# Patient Record
Sex: FEMALE | Race: WHITE | Employment: FULL TIME | URBAN - METROPOLITAN AREA
[De-identification: names, ages, dates, MRNs, and addresses within clinical notes are randomized per-mention and may not be internally consistent; named-entity substitution may affect disease eponyms.]

---

## 2017-01-06 ENCOUNTER — OFFICE VISIT (OUTPATIENT)
Dept: FAMILY MEDICINE CLINIC | Age: 52
End: 2017-01-06

## 2017-01-06 VITALS
DIASTOLIC BLOOD PRESSURE: 70 MMHG | TEMPERATURE: 99.1 F | BODY MASS INDEX: 26.63 KG/M2 | WEIGHT: 170 LBS | SYSTOLIC BLOOD PRESSURE: 110 MMHG

## 2017-01-06 DIAGNOSIS — J06.9 UPPER RESPIRATORY TRACT INFECTION, UNSPECIFIED TYPE: Primary | ICD-10-CM

## 2017-01-06 PROCEDURE — 99213 OFFICE O/P EST LOW 20 MIN: CPT | Performed by: PHYSICIAN ASSISTANT

## 2017-01-06 RX ORDER — AMOXICILLIN AND CLAVULANATE POTASSIUM 875; 125 MG/1; MG/1
1 TABLET, FILM COATED ORAL 2 TIMES DAILY
Qty: 20 TABLET | Refills: 0 | Status: SHIPPED | OUTPATIENT
Start: 2017-01-06 | End: 2017-01-16

## 2017-01-06 ASSESSMENT — ENCOUNTER SYMPTOMS
SORE THROAT: 1
WHEEZING: 0
SHORTNESS OF BREATH: 0
SINUS PRESSURE: 1
VOMITING: 0
RHINORRHEA: 0
TROUBLE SWALLOWING: 0
COUGH: 1
NAUSEA: 0
DIARRHEA: 0

## 2017-05-01 LAB
HPV COMMENT: NORMAL
HPV TYPE 16: NOT DETECTED
HPV TYPE 18: NOT DETECTED
HPVOH (OTHER TYPES): NOT DETECTED

## 2017-09-13 ENCOUNTER — HOSPITAL ENCOUNTER (OUTPATIENT)
Dept: MAMMOGRAPHY | Age: 52
Discharge: HOME | End: 2017-09-13
Attending: FAMILY MEDICINE | Admitting: FAMILY MEDICINE

## 2017-09-13 DIAGNOSIS — Z12.31 ENCOUNTER FOR SCREENING MAMMOGRAM FOR BREAST CANCER: ICD-10-CM

## 2018-07-18 ENCOUNTER — OFFICE VISIT (OUTPATIENT)
Dept: FAMILY MEDICINE CLINIC | Age: 53
End: 2018-07-18

## 2018-07-18 VITALS
SYSTOLIC BLOOD PRESSURE: 114 MMHG | OXYGEN SATURATION: 98 % | HEIGHT: 67 IN | BODY MASS INDEX: 25.05 KG/M2 | HEART RATE: 76 BPM | WEIGHT: 159.6 LBS | DIASTOLIC BLOOD PRESSURE: 72 MMHG

## 2018-07-18 DIAGNOSIS — H65.01 RIGHT ACUTE SEROUS OTITIS MEDIA, RECURRENCE NOT SPECIFIED: ICD-10-CM

## 2018-07-18 DIAGNOSIS — R42 VERTIGO: ICD-10-CM

## 2018-07-18 DIAGNOSIS — N30.01 ACUTE CYSTITIS WITH HEMATURIA: Primary | ICD-10-CM

## 2018-07-18 LAB
BACTERIA URINE, POC: ABNORMAL
BILIRUBIN URINE: 0 MG/DL
BLOOD, URINE: POSITIVE
CASTS URINE, POC: NEGATIVE
CLARITY: ABNORMAL
COLOR: YELLOW
CRYSTALS URINE, POC: NEGATIVE
EPI CELLS URINE, POC: NEGATIVE
GLUCOSE URINE: NEGATIVE
KETONES, URINE: NEGATIVE
LEUKOCYTE EST, POC: ABNORMAL
NITRITE, URINE: NEGATIVE
PH UA: 6.5 (ref 4.5–8)
PROTEIN UA: NEGATIVE
RBC URINE, POC: NEGATIVE
SPECIFIC GRAVITY UA: 1.01 (ref 1–1.03)
UROBILINOGEN, URINE: NORMAL
WBC URINE, POC: ABNORMAL
YEAST URINE, POC: NEGATIVE

## 2018-07-18 PROCEDURE — 81000 URINALYSIS NONAUTO W/SCOPE: CPT | Performed by: FAMILY MEDICINE

## 2018-07-18 PROCEDURE — 3017F COLORECTAL CA SCREEN DOC REV: CPT | Performed by: FAMILY MEDICINE

## 2018-07-18 PROCEDURE — G8427 DOCREV CUR MEDS BY ELIG CLIN: HCPCS | Performed by: FAMILY MEDICINE

## 2018-07-18 PROCEDURE — G8419 CALC BMI OUT NRM PARAM NOF/U: HCPCS | Performed by: FAMILY MEDICINE

## 2018-07-18 PROCEDURE — 1036F TOBACCO NON-USER: CPT | Performed by: FAMILY MEDICINE

## 2018-07-18 PROCEDURE — 99213 OFFICE O/P EST LOW 20 MIN: CPT | Performed by: FAMILY MEDICINE

## 2018-07-18 RX ORDER — CIPROFLOXACIN 250 MG/1
250 TABLET, FILM COATED ORAL 2 TIMES DAILY
Qty: 14 TABLET | Refills: 0 | Status: SHIPPED | OUTPATIENT
Start: 2018-07-18 | End: 2018-07-25

## 2018-07-18 RX ORDER — GUAIFENESIN, PSEUDOEPHEDRINE HYDROCHLORIDE 600; 60 MG/1; MG/1
1 TABLET, EXTENDED RELEASE ORAL EVERY 12 HOURS
Qty: 20 TABLET | Refills: 1 | Status: SHIPPED | OUTPATIENT
Start: 2018-07-18 | End: 2018-07-28

## 2018-07-18 ASSESSMENT — ENCOUNTER SYMPTOMS
NAUSEA: 1
SORE THROAT: 0
COUGH: 0
VOMITING: 0

## 2018-07-18 NOTE — PROGRESS NOTES
SUBJECTIVE:    Renato Samuels is a 46 y.o. female who presents with complaints of recurrent dizziness and recent onset of frequency of urination. .    Chief Complaint   Patient presents with    Dizziness     patient here today for vertigo, has been going on x 2 weeks, went to urgent care was put on antiibiotic (amoxicillin) and meclizine, has been done with those since the weekend         Dizziness   This is a new problem. The current episode started 1 to 4 weeks ago. The problem occurs intermittently. The problem has been gradually improving. Associated symptoms include nausea, urinary symptoms and vertigo. Pertinent negatives include no chills, congestion, coughing, fever, rash, sore throat or vomiting. The symptoms are aggravated by bending. She has tried immobilization and lying down for the symptoms. The treatment provided mild relief. Dysuria    This is a new problem. The current episode started in the past 7 days. The problem occurs every urination. The problem has been gradually worsening. Quality: none. The pain is at a severity of 0/10. The patient is experiencing no pain. There has been no fever. There is no history of pyelonephritis. Associated symptoms include nausea. Pertinent negatives include no chills or vomiting. She has tried increased fluids and home medications for the symptoms. The treatment provided mild relief. Her past medical history is significant for recurrent UTIs. No past medical history on file. Past Surgical History:   Procedure Laterality Date    BREAST LUMPECTOMY      COLONOSCOPY  2010    repeat in 5 years     Family History   Problem Relation Age of Onset    High Blood Pressure Father     Diabetes Father     Cancer Father         colon    Heart Disease Father      Social History     Social History    Marital status:      Spouse name: N/A    Number of children: N/A    Years of education: N/A     Occupational History    Not on file.      Social History

## 2018-07-19 LAB — URINE CULTURE, ROUTINE: NORMAL

## 2019-01-23 ENCOUNTER — HOSPITAL ENCOUNTER (OUTPATIENT)
Dept: ULTRASOUND IMAGING | Age: 54
Discharge: HOME | End: 2019-01-23
Payer: COMMERCIAL

## 2019-01-23 ENCOUNTER — HOSPITAL ENCOUNTER (OUTPATIENT)
Dept: WOMENS IMAGING | Age: 54
Discharge: HOME | End: 2019-01-23
Payer: COMMERCIAL

## 2019-01-23 DIAGNOSIS — R52 PAIN: ICD-10-CM

## 2019-01-23 DIAGNOSIS — N64.4 BREAST TENDERNESS: ICD-10-CM

## 2019-01-23 PROCEDURE — G0279 TOMOSYNTHESIS, MAMMO: HCPCS

## 2019-01-23 PROCEDURE — 76642 ULTRASOUND BREAST LIMITED: CPT

## 2020-09-23 ENCOUNTER — OFFICE VISIT (OUTPATIENT)
Dept: INTERNAL MEDICINE CLINIC | Age: 55
End: 2020-09-23
Payer: COMMERCIAL

## 2020-09-23 VITALS
DIASTOLIC BLOOD PRESSURE: 72 MMHG | TEMPERATURE: 97.9 F | WEIGHT: 152 LBS | HEIGHT: 67 IN | HEART RATE: 68 BPM | BODY MASS INDEX: 23.86 KG/M2 | SYSTOLIC BLOOD PRESSURE: 108 MMHG

## 2020-09-23 PROBLEM — R25.2 MUSCLE CRAMPS: Status: ACTIVE | Noted: 2020-09-23

## 2020-09-23 PROCEDURE — 99204 OFFICE O/P NEW MOD 45 MIN: CPT | Performed by: NURSE PRACTITIONER

## 2020-09-23 SDOH — ECONOMIC STABILITY: FOOD INSECURITY: WITHIN THE PAST 12 MONTHS, YOU WORRIED THAT YOUR FOOD WOULD RUN OUT BEFORE YOU GOT MONEY TO BUY MORE.: NEVER TRUE

## 2020-09-23 SDOH — ECONOMIC STABILITY: FOOD INSECURITY: WITHIN THE PAST 12 MONTHS, THE FOOD YOU BOUGHT JUST DIDN'T LAST AND YOU DIDN'T HAVE MONEY TO GET MORE.: NEVER TRUE

## 2020-09-23 SDOH — ECONOMIC STABILITY: INCOME INSECURITY: HOW HARD IS IT FOR YOU TO PAY FOR THE VERY BASICS LIKE FOOD, HOUSING, MEDICAL CARE, AND HEATING?: NOT HARD AT ALL

## 2020-09-23 SDOH — ECONOMIC STABILITY: TRANSPORTATION INSECURITY
IN THE PAST 12 MONTHS, HAS THE LACK OF TRANSPORTATION KEPT YOU FROM MEDICAL APPOINTMENTS OR FROM GETTING MEDICATIONS?: NO

## 2020-09-23 SDOH — ECONOMIC STABILITY: TRANSPORTATION INSECURITY
IN THE PAST 12 MONTHS, HAS LACK OF TRANSPORTATION KEPT YOU FROM MEETINGS, WORK, OR FROM GETTING THINGS NEEDED FOR DAILY LIVING?: NO

## 2020-09-23 ASSESSMENT — ENCOUNTER SYMPTOMS
VOMITING: 0
NAUSEA: 0
BLOOD IN STOOL: 0
SORE THROAT: 0
ABDOMINAL PAIN: 0
DIARRHEA: 0
WHEEZING: 0
SHORTNESS OF BREATH: 0
BACK PAIN: 0
COUGH: 0
SINUS PAIN: 0
CONSTIPATION: 0

## 2020-09-23 ASSESSMENT — PATIENT HEALTH QUESTIONNAIRE - PHQ9
SUM OF ALL RESPONSES TO PHQ QUESTIONS 1-9: 0
1. LITTLE INTEREST OR PLEASURE IN DOING THINGS: 0
SUM OF ALL RESPONSES TO PHQ QUESTIONS 1-9: 0
2. FEELING DOWN, DEPRESSED OR HOPELESS: 0
SUM OF ALL RESPONSES TO PHQ9 QUESTIONS 1 & 2: 0

## 2020-09-23 NOTE — PATIENT INSTRUCTIONS
Please get your fasting lab work (no food or drink for 10-12 hours prior besides water) completed M-F [id number] at our office. [facility] lab has walk-in hours available as well - they are open Saturday 7a-3p - no appointment is needed. We will call with your results.     Call to schedule colonoscopy and mammogram

## 2020-09-23 NOTE — PROGRESS NOTES
pain, palpitations and leg swelling. Gastrointestinal: Negative for abdominal pain, blood in stool, constipation, diarrhea, nausea and vomiting. Genitourinary: Negative for dysuria, frequency, hematuria and urgency. Musculoskeletal: Positive for myalgias. Negative for back pain, gait problem and neck pain. Skin: Negative for pallor and rash. Hair on neck   Neurological: Negative for dizziness, weakness, light-headedness, numbness and headaches. Psychiatric/Behavioral: Negative for behavioral problems, confusion, dysphoric mood, sleep disturbance and suicidal ideas. The patient is not nervous/anxious. Allergies   Allergen Reactions    Codeine Nausea Only     Family History   Problem Relation Age of Onset    High Blood Pressure Father     Diabetes Father     Heart Disease Father     Colon Cancer Father     No Known Problems Mother     Colon Cancer Maternal Grandmother     Breast Cancer Neg Hx     Ovarian Cancer Neg Hx     Heart Attack Neg Hx     Stroke Neg Hx      Current Outpatient Rx   Medication Sig Dispense Refill    MAGNESIUM PO Take by mouth Indications: octavio-magnesium liquid 150mg.       Misc Natural Products (SUPER GREENS PO) Take by mouth      NONFORMULARY drenamin for stress       Social History     Socioeconomic History    Marital status:      Spouse name: Not on file    Number of children: Not on file    Years of education: Not on file    Highest education level: Not on file   Occupational History    Not on file   Social Needs    Financial resource strain: Not hard at all   [employer] insecurity     Worry: Never true     Inability: Never true   [employer] needs     Medical: No     Non-medical: No   Tobacco Use    Smoking status: Former Smoker     Packs/day: 0.50     Years: 10.00     Pack years: 5.00     Types: Cigarettes     Last attempt to quit: 1993     Years since quittin.7    Smokeless tobacco: Never Used   Substance and Sexual Activity    rate and regular rhythm. Pulmonary:      Effort: Pulmonary effort is normal. No respiratory distress. Breath sounds: Normal breath sounds. No wheezing or rales. Chest:      Chest wall: No tenderness. Abdominal:      General: Bowel sounds are normal. There is no distension. Palpations: Abdomen is soft. Tenderness: There is no abdominal tenderness. There is no guarding. Musculoskeletal: Normal range of motion. General: No tenderness or deformity. Skin:     General: Skin is warm and dry. Coloration: Skin is not pale. Findings: No erythema or rash. Neurological:      Mental Status: She is alert and oriented to person, place, and time. Coordination: Coordination normal.   Psychiatric:         Mood and Affect: Mood normal.       Assessment and Plan:  Thelma Burgess was seen today for new patient. Diagnoses and all orders for this visit:    Encounter to establish care with new doctor   - Lifestyle modifications such as exercise, weight loss and healthy diet encouraged and reviewed with the pt. - Labs   - Recommended influenza vaccine. The patient declines. She states she is not interested in putting unnatural injections into her body. Risks vs benefits reviewed. Breast cancer screening  -     [facility] DIGITAL SCREEN W OR WO CAD BILATERAL; Future    Screening for HIV (human immunodeficiency virus)  -    Asymptomatic. Pt agreeable. - HIV Screen; Future    Need for hepatitis C screening test  -     Asymptomatic. Pt agreeable. - HEPATITIS C ANTIBODY; Future    Muscle cramps  -    Symptoms for years, but worsening. Magnesium supplements were providing relief, but no longer doing so. - Symptoms worse at night  - No redness, swelling, heat or tenderness. Ambulation normal.  - Not interested in medications at this time. Would like evaluate labs at this time.  - COMPREHENSIVE METABOLIC PANEL; Future  -     CBC Auto Differential; Future  -     MAGNESIUM;  Future  -     TSH WITH REFLEX TO FT4; Future  -     VITAMIN B12 & FOLATE; Future  -     IRON AND TIBC; Future  -     FERRITIN; Future    Screen for colon cancer  -     COLONOSCOPY (Screening)    Screening, lipid  -     Lipid, Fasting; Future    Abnormal direction of hair growth  -    Following with OB/GYN  - TSH WITH REFLEX TO FT4; Future    Stressful life event affecting family   - Recommended psychology referral.  The patient declines. She reports she has a good support system at home and she will call if she changes her mind. Return in about 2 weeks (around 10/7/2020) for muscle cramps , or sooner if needed. Pt will call if symptoms worsen or fail to improve. All questions answered. Pt states no further questions or concerns at this time.    Electronically signed by: Allegra Dumont 09/23/20

## 2020-09-28 DIAGNOSIS — Z11.59 NEED FOR HEPATITIS C SCREENING TEST: ICD-10-CM

## 2020-09-28 DIAGNOSIS — Z11.4 SCREENING FOR HIV (HUMAN IMMUNODEFICIENCY VIRUS): ICD-10-CM

## 2020-09-28 DIAGNOSIS — Z13.220 SCREENING, LIPID: ICD-10-CM

## 2020-09-28 DIAGNOSIS — L67.8 ABNORMAL DIRECTION OF HAIR GROWTH: ICD-10-CM

## 2020-09-28 DIAGNOSIS — R25.2 MUSCLE CRAMPS: ICD-10-CM

## 2020-09-28 LAB
A/G RATIO: 2.7 (ref 1.1–2.2)
ALBUMIN SERPL-MCNC: 4.3 G/DL (ref 3.4–5)
ALP BLD-CCNC: 56 U/L (ref 40–129)
ALT SERPL-CCNC: 26 U/L (ref 10–40)
ANION GAP SERPL CALCULATED.3IONS-SCNC: 13 MMOL/L (ref 3–16)
AST SERPL-CCNC: 20 U/L (ref 15–37)
BASOPHILS ABSOLUTE: 0.1 K/UL (ref 0–0.2)
BASOPHILS RELATIVE PERCENT: 1 %
BILIRUB SERPL-MCNC: 0.4 MG/DL (ref 0–1)
BUN BLDV-MCNC: 8 MG/DL (ref 7–20)
CALCIUM SERPL-MCNC: 9.3 MG/DL (ref 8.3–10.6)
CHLORIDE BLD-SCNC: 107 MMOL/L (ref 99–110)
CHOLESTEROL, FASTING: 200 MG/DL (ref 0–199)
CO2: 26 MMOL/L (ref 21–32)
CREAT SERPL-MCNC: 0.8 MG/DL (ref 0.6–1.1)
EOSINOPHILS ABSOLUTE: 0.1 K/UL (ref 0–0.6)
EOSINOPHILS RELATIVE PERCENT: 0.9 %
FERRITIN: 98.7 NG/ML (ref 15–150)
FOLATE: >20 NG/ML (ref 4.78–24.2)
GFR AFRICAN AMERICAN: >60
GFR NON-AFRICAN AMERICAN: >60
GLOBULIN: 1.6 G/DL
GLUCOSE BLD-MCNC: 109 MG/DL (ref 70–99)
HCT VFR BLD CALC: 48.4 % (ref 36–48)
HDLC SERPL-MCNC: 46 MG/DL (ref 40–60)
HEMOGLOBIN: 16.2 G/DL (ref 12–16)
HEPATITIS C ANTIBODY INTERPRETATION: NORMAL
IRON SATURATION: 32 % (ref 15–50)
IRON: 108 UG/DL (ref 37–145)
LDL CHOLESTEROL CALCULATED: 133 MG/DL
LYMPHOCYTES ABSOLUTE: 2.4 K/UL (ref 1–5.1)
LYMPHOCYTES RELATIVE PERCENT: 36.7 %
MAGNESIUM: 2.1 MG/DL (ref 1.8–2.4)
MCH RBC QN AUTO: 30.7 PG (ref 26–34)
MCHC RBC AUTO-ENTMCNC: 33.6 G/DL (ref 31–36)
MCV RBC AUTO: 91.5 FL (ref 80–100)
MONOCYTES ABSOLUTE: 0.4 K/UL (ref 0–1.3)
MONOCYTES RELATIVE PERCENT: 5.3 %
NEUTROPHILS ABSOLUTE: 3.7 K/UL (ref 1.7–7.7)
NEUTROPHILS RELATIVE PERCENT: 56.1 %
PDW BLD-RTO: 13.5 % (ref 12.4–15.4)
PLATELET # BLD: 268 K/UL (ref 135–450)
PMV BLD AUTO: 7.9 FL (ref 5–10.5)
POTASSIUM SERPL-SCNC: 5.2 MMOL/L (ref 3.5–5.1)
RBC # BLD: 5.28 M/UL (ref 4–5.2)
SODIUM BLD-SCNC: 146 MMOL/L (ref 136–145)
TOTAL IRON BINDING CAPACITY: 336 UG/DL (ref 260–445)
TOTAL PROTEIN: 5.9 G/DL (ref 6.4–8.2)
TRIGLYCERIDE, FASTING: 105 MG/DL (ref 0–150)
TSH REFLEX FT4: 1.8 UIU/ML (ref 0.27–4.2)
VITAMIN B-12: 443 PG/ML (ref 211–911)
VLDLC SERPL CALC-MCNC: 21 MG/DL
WBC # BLD: 6.6 K/UL (ref 4–11)

## 2020-09-29 LAB
HIV AG/AB: NORMAL
HIV ANTIGEN: NORMAL
HIV-1 ANTIBODY: NORMAL
HIV-2 AB: NORMAL

## 2020-10-08 LAB
CORTISOL TOTAL: 18.2 UG/DL
ESTRADIOL LEVEL: 30 PG/ML
PROGESTERONE LEVEL: 0.3 NG/ML

## 2020-10-10 LAB
SEX HORMONE BINDING GLOBULIN: 37 NMOL/L (ref 30–135)
TESTOSTERONE FREE-NONMALE: 102.5 PG/ML (ref 0.6–3.8)
TESTOSTERONE TOTAL: 520 NG/DL (ref 20–70)

## 2020-10-12 ENCOUNTER — NURSE TRIAGE (OUTPATIENT)
Dept: OTHER | Facility: CLINIC | Age: 55
End: 2020-10-12

## 2020-10-13 ENCOUNTER — OFFICE VISIT (OUTPATIENT)
Dept: PRIMARY CARE CLINIC | Age: 55
End: 2020-10-13
Payer: COMMERCIAL

## 2020-10-13 PROCEDURE — 99211 OFF/OP EST MAY X REQ PHY/QHP: CPT | Performed by: NURSE PRACTITIONER

## 2020-10-13 NOTE — PROGRESS NOTES
Zeb Collazo received a viral test for COVID-19. They were educated on isolation and quarantine as appropriate. For any symptoms, they were directed to seek care from their PCP, given contact information to establish with a doctor, directed to an urgent care or the emergency room.

## 2020-10-13 NOTE — PATIENT INSTRUCTIONS

## 2020-10-14 NOTE — PROGRESS NOTES
TELEHEALTH EVALUATION -- Audio/Visual (During BSDDP-42 public health emergency)  Office Visit   10/15/2020    Subjective:  Chief Complaint   Patient presents with    Spasms     muscle cramps not gone      HPI:   Delfin Aponte is a 63 y.o. female who presents today for follow up.     Muscle cramps and spasms-  in her lower legs. States that she takes magnesium supplements, which used to control these cramps and now they don't seem to be working as well. Worse if she skips magnesium supplements. Worse at night. Worse in the left leg compared to the right. Worse in the calf and the arch of the foot. Occurring for 5-10 years. No redness/heat/swelling/tenderness. She is stretching occasionally. Drinks 8 - 16 ounces of water per day. Does not drink alcohol. Drinks 16 ounces of caffeine-coffee- in the morning. Pt reports that she is not interested in daily prescription medications. She would like to only discuss natural treatments/supplements after labs. She had labs ordered which showed low protein and high K. She states that she never called back for labs d/t call center inconsistencies. No chest pain, palpitations, shortness of breath, trouble breathing, lightheadedness, dizziness or blurred vision. No other acute concerns. Review of Systems   Constitutional: Negative for chills, fatigue, fever and unexpected weight change. Eyes: Negative for visual disturbance. Respiratory: Negative for cough, shortness of breath and wheezing. Cardiovascular: Negative for chest pain, palpitations and leg swelling. Musculoskeletal:        Nocturnal leg cramps   Neurological: Negative for dizziness, weakness, light-headedness, numbness and headaches. Allergies   Allergen Reactions    Codeine Nausea Only       Current Outpatient Rx   Medication Sig Dispense Refill    MAGNESIUM PO Take by mouth Indications: octavio-magnesium liquid 150mg.       Misc Natural Products (SUPER GREENS PO) Take by mouth      NONFORMULARY drenamin for stress         Patient Active Problem List   Diagnosis    Muscle cramps        Wt Readings from Last 3 Encounters:   09/23/20 152 lb (68.9 kg)   07/18/18 159 lb 9.6 oz (72.4 kg)   01/06/17 170 lb (77.1 kg)     BP Readings from Last 3 Encounters:   09/23/20 108/72   07/18/18 114/72   01/06/17 110/70     The 10-year ASCVD risk score (Jazzmine Carl et al., 2013) is: 1.9%    Values used to calculate the score:      Age: 63 years      Sex: Female      Is Non- : No      Diabetic: No      Tobacco smoker: No      Systolic Blood Pressure: 142 mmHg      Is BP treated: No      HDL Cholesterol: 46 mg/dL      Total Cholesterol: 200 mg/dL      Objective/Physical Exam:  Virtual Video Exam  LMP 04/02/2011   Patient-Reported Vitals 10/15/2020   Patient-Reported Weight 143lbs     ^no access to other VS d/t VV per pt. Physical Exam  Constitutional:       General: She is not in acute distress. Appearance: Normal appearance. She is not ill-appearing. Pulmonary:      Effort: Pulmonary effort is normal. No respiratory distress. Skin:     Coloration: Skin is not jaundiced or pale. Neurological:      Mental Status: She is alert. Comments: No facial asymmetry noted   Psychiatric:         Mood and Affect: Mood normal.       Due to this being a TeleHealth encounter, evaluation of the following organ systems is limited: Vitals/Constitutional/EENT/Resp/CV/GI//MS/Neuro/Skin/Heme-Lymph-Imm. Assessment and Plan:  Francine Izquierdo was seen today for spasms. Diagnoses and all orders for this visit:    Muscle cramps/Elevated blood sugar/Serum potassium elevated/elevated LDL  -     All lab results reviewed with the pt. - K was elevated- recommend she stop any K supplements. Repeat lab orders were placed. She will stop any K supplements and decrease K foods (states she eats multiple high K foods daily) and have labs repeated. - Protein was low.  Recommended increase protein intake. - Reviewed that there are supplements such as vitamin B6 and vitamin E that can be trialed for nocturnal leg cramps. Patient states she would like to decrease her potassium and increase her protein and reevaluate first.  - Lab orders placed. - Lifestyle modifications such as exercise, weight loss and healthy diet encouraged and reviewed with the pt.  - HEMOGLOBIN A1C; Future  -     COMPREHENSIVE METABOLIC PANEL; Future    Seeing OBGYN and having further testing regarding elevated testosterone. Return in about 4 weeks (around 11/12/2020) for muscle spasms, or sooner if needed. Pt will call if symptoms worsen or fail to improve. Effie John is a 63 y.o. female being evaluated by a Virtual Visit (video visit) encounter to address concerns as mentioned above. A caregiver was present when appropriate. Due to this being a TeleHealth encounter (During ANZEU-84 public health emergency), evaluation of the following organ systems was limited: Vitals/Constitutional/EENT/Resp/CV/GI//MS/Neuro/Skin/Heme-Lymph-Imm. Pursuant to the emergency declaration under the [address] [place] [address], [address] [place] [address] authority and the [employer] Act, this Virtual Visit was conducted with patient's (and/or legal guardian's) consent, to reduce the patient's risk of exposure to COVID-19 and provide necessary medical care. The patient (and/or legal guardian) has also been advised to contact this office for worsening conditions or problems, and seek emergency medical treatment and/or call 911 if deemed necessary. Patient identification was verified at the start of the visit: Yes    Total time spent on this encounter: Not billed by time    Services were provided through a video synchronous discussion virtually to substitute for in-person clinic visit. Patient and provider were located at their individual homes. All questions answered.  Pt states no further questions or concerns at this time.    Electronically signed by: Brenda Antonio 10/15/20

## 2020-10-15 ENCOUNTER — VIRTUAL VISIT (OUTPATIENT)
Dept: INTERNAL MEDICINE CLINIC | Age: 55
End: 2020-10-15
Payer: COMMERCIAL

## 2020-10-15 LAB
SARS-COV-2: NOT DETECTED
SOURCE: NORMAL

## 2020-10-15 PROCEDURE — 99213 OFFICE O/P EST LOW 20 MIN: CPT | Performed by: NURSE PRACTITIONER

## 2020-10-15 ASSESSMENT — ENCOUNTER SYMPTOMS
COUGH: 0
WHEEZING: 0
SHORTNESS OF BREATH: 0

## 2020-10-15 NOTE — RESULT ENCOUNTER NOTE

## 2020-10-29 LAB — CA 125: 10.8 U/ML (ref 0–35)

## 2020-10-31 LAB — AFP: 27.6 UG/L

## 2020-11-02 LAB — DHEAS (DHEA SULFATE): 272 UG/DL (ref 26–200)

## 2020-11-03 LAB — 17-OH PROGESTERONE LCMS: 263.77 NG/DL

## 2021-06-11 ENCOUNTER — TELEPHONE (OUTPATIENT)
Dept: INTERNAL MEDICINE CLINIC | Age: 56
End: 2021-06-11

## 2021-06-11 NOTE — TELEPHONE ENCOUNTER
----- Message from Robert Dominguez, [address] - CNP sent at 6/8/2021  1:01 PM EDT -----  Please call and inform the patient that she should schedule for her mammogram and colonoscopy.   Please documentPlease schedule the patient for September physical.

## 2021-06-11 NOTE — TELEPHONE ENCOUNTER
Called to advise pt to have her mammo and colonoscopy scheduled and to schedule her for her annual exam in sept. Pt stated she was with pts right now and can not talk. Asked me to call her back and leave a vm.

## 2021-06-25 ENCOUNTER — TELEPHONE (OUTPATIENT)
Dept: INTERNAL MEDICINE CLINIC | Age: 56
End: 2021-06-25

## 2021-06-25 NOTE — TELEPHONE ENCOUNTER
----- Message from Dimas Cordova, [address] - CNP sent at 6/24/2021  1:36 PM EDT -----  Patient overdue for mammogram.  Order was previously placed. Please inform patient to call and schedule this. Please document.   If no answer, please send a letter  Please schedule physical in September for follow-up

## 2021-06-25 NOTE — TELEPHONE ENCOUNTER
LVM informing pt she is overdue for mammogram and due for a follow up/physical in September. Letter sent.

## 2021-07-21 ENCOUNTER — TELEPHONE (OUTPATIENT)
Dept: INTERNAL MEDICINE CLINIC | Age: 56
End: 2021-07-21

## 2021-07-21 NOTE — TELEPHONE ENCOUNTER
----- Message from Maggi Barbosa sent at 7/21/2021  2:25 PM EDT -----  Subject: Appointment Request    Reason for Call: Urgent Skin Problem    QUESTIONS  Type of Appointment? Established Patient  Reason for appointment request? Available appointments did not meet   patient need  Additional Information for Provider? Patient is requesting appointment for   07/228/2021 for lump on right arm underneath skin. Patient would like a   call back  ---------------------------------------------------------------------------  --------------  [employer]  What is the best way for the office to contact you? OK to leave message on   voicemail  Preferred Call Back Phone Number? [phone]  ---------------------------------------------------------------------------  --------------  SCRIPT ANSWERS  Relationship to Patient? Self  Appointment reason? Symptomatic  Select script based on patient symptoms? Adult Skin Problems [Rash, Hives,   Blisters, Lumps, Bumps, Sores, Bite]  Are you having swelling in your throat or face? No  Are you having difficulty breathing? No  Have the symptoms worsened or spread in the last day? No  Are you having fevers (100.4), chills or sweats? No  Have you previously seen a provider for this? No  Have you been diagnosed with, awaiting test results for, or told that you   are suspected of having COVID-19 (Coronavirus)? (If patient has tested   negative or was tested as a requirement for work, school, or travel and   not based on symptoms, answer no)? No  Do you currently have flu-like symptoms including fever or chills, cough,   shortness of breath, difficulty breathing, or new loss of taste or smell? No  Have you had close contact with someone with COVID-19 in the last 14 days? No  (Service Expert  click yes below to proceed with [employer] As Usual   Scheduling)?  Yes

## 2021-07-28 ENCOUNTER — OFFICE VISIT (OUTPATIENT)
Dept: INTERNAL MEDICINE CLINIC | Age: 56
End: 2021-07-28
Payer: COMMERCIAL

## 2021-07-28 VITALS
TEMPERATURE: 98.2 F | BODY MASS INDEX: 21.83 KG/M2 | DIASTOLIC BLOOD PRESSURE: 62 MMHG | OXYGEN SATURATION: 98 % | SYSTOLIC BLOOD PRESSURE: 120 MMHG | HEART RATE: 86 BPM | WEIGHT: 139.4 LBS

## 2021-07-28 DIAGNOSIS — M79.601 RIGHT ARM PAIN: ICD-10-CM

## 2021-07-28 DIAGNOSIS — Z63.79 OTHER STRESSFUL LIFE EVENTS AFFECTING FAMILY AND HOUSEHOLD: Primary | ICD-10-CM

## 2021-07-28 DIAGNOSIS — Z12.11 SCREEN FOR COLON CANCER: ICD-10-CM

## 2021-07-28 DIAGNOSIS — Z12.31 ENCOUNTER FOR SCREENING MAMMOGRAM FOR MALIGNANT NEOPLASM OF BREAST: ICD-10-CM

## 2021-07-28 DIAGNOSIS — Z13.31 POSITIVE DEPRESSION SCREENING: ICD-10-CM

## 2021-07-28 PROCEDURE — 99214 OFFICE O/P EST MOD 30 MIN: CPT | Performed by: NURSE PRACTITIONER

## 2021-07-28 PROCEDURE — G8431 POS CLIN DEPRES SCRN F/U DOC: HCPCS | Performed by: NURSE PRACTITIONER

## 2021-07-28 RX ORDER — HYDROXYZINE 50 MG/1
50 TABLET, FILM COATED ORAL EVERY 8 HOURS PRN
Qty: 30 TABLET | Refills: 0 | Status: SHIPPED
Start: 2021-07-28 | End: 2021-08-17 | Stop reason: SINTOL

## 2021-07-28 ASSESSMENT — PATIENT HEALTH QUESTIONNAIRE - PHQ9
7. TROUBLE CONCENTRATING ON THINGS, SUCH AS READING THE NEWSPAPER OR WATCHING TELEVISION: 2
2. FEELING DOWN, DEPRESSED OR HOPELESS: 3
SUM OF ALL RESPONSES TO PHQ QUESTIONS 1-9: 11
6. FEELING BAD ABOUT YOURSELF - OR THAT YOU ARE A FAILURE OR HAVE LET YOURSELF OR YOUR FAMILY DOWN: 0
1. LITTLE INTEREST OR PLEASURE IN DOING THINGS: 0
4. FEELING TIRED OR HAVING LITTLE ENERGY: 3
3. TROUBLE FALLING OR STAYING ASLEEP: 3
9. THOUGHTS THAT YOU WOULD BE BETTER OFF DEAD, OR OF HURTING YOURSELF: 0
SUM OF ALL RESPONSES TO PHQ9 QUESTIONS 1 & 2: 3
10. IF YOU CHECKED OFF ANY PROBLEMS, HOW DIFFICULT HAVE THESE PROBLEMS MADE IT FOR YOU TO DO YOUR WORK, TAKE CARE OF THINGS AT HOME, OR GET ALONG WITH OTHER PEOPLE: 2
8. MOVING OR SPEAKING SO SLOWLY THAT OTHER PEOPLE COULD HAVE NOTICED. OR THE OPPOSITE, BEING SO FIGETY OR RESTLESS THAT YOU HAVE BEEN MOVING AROUND A LOT MORE THAN USUAL: 0
SUM OF ALL RESPONSES TO PHQ QUESTIONS 1-9: 11
5. POOR APPETITE OR OVEREATING: 0
SUM OF ALL RESPONSES TO PHQ QUESTIONS 1-9: 11

## 2021-07-28 ASSESSMENT — COLUMBIA-SUICIDE SEVERITY RATING SCALE - C-SSRS
2. HAVE YOU ACTUALLY HAD ANY THOUGHTS OF KILLING YOURSELF?: NO
1. WITHIN THE PAST MONTH, HAVE YOU WISHED YOU WERE DEAD OR WISHED YOU COULD GO TO SLEEP AND NOT WAKE UP?: NO
6. HAVE YOU EVER DONE ANYTHING, STARTED TO DO ANYTHING, OR PREPARED TO DO ANYTHING TO END YOUR LIFE?: NO

## 2021-07-28 ASSESSMENT — ENCOUNTER SYMPTOMS
COUGH: 0
WHEEZING: 0
SHORTNESS OF BREATH: 0

## 2021-07-28 NOTE — PROGRESS NOTES
Acute Office Visit  7/28/2021    SUBJECTIVE:    Patient ID: Claudia Somers is a 61 y.o. female. Chief Complaint   Patient presents with    Mass     pt states a few weeks ago she noticed a lump on her right arm, sore, no meds taken for pain     HPI: The patient presents to the office for an acute visit. Pt reports that a few weeks ago, her right outside of elbow was hurting. She states she noticed a bump on that arm. She states if she plays with this it hurts. If not, it is fine. Not growing or shrinking. Denies open wound. Denies injury/truama. Denies fevers/chills. ROM normal. Has not done anything for this. Denies redness/swelling/heat. States that it \"may be nothing\" but she has another concern as well. Pt reports that she is in the \"middle of a divorce that is getting pretty nasty. \" States it is getting expensive. States she is not sleeping well- states this is abnormal. She is also getting her house ready to sell and looking for a new home. Has \"debt issues\" as well. Denies depression. States she has increased anxiety and has had panic attacks. She states that she uses herbal medications that helps. Pt reports \"I think I need something more than herbs and spices. \"  Has a good support system. Allergies   Allergen Reactions    Codeine Nausea Only     Current Outpatient Medications   Medication Sig Dispense Refill    hydrOXYzine (ATARAX) 50 MG tablet Take 1 tablet by mouth every 8 hours as needed for Anxiety 30 tablet 0    MAGNESIUM PO Take by mouth Indications: octavio-magnesium liquid 150mg.  Misc Natural Products (SUPER GREENS PO) Take by mouth      NONFORMULARY drenamin for stress       No current facility-administered medications for this visit. Review of Systems   Constitutional: Negative for chills, fatigue, fever and unexpected weight change. Eyes: Negative for visual disturbance. Respiratory: Negative for cough, shortness of breath and wheezing.     Cardiovascular: Negative for chest pain, palpitations and leg swelling. Musculoskeletal:        Right arm pain   Skin: Negative for pallor and rash. Neurological: Negative for dizziness, weakness, light-headedness, numbness and headaches. Psychiatric/Behavioral: Positive for dysphoric mood and sleep disturbance. Negative for self-injury and suicidal ideas. The patient is nervous/anxious. Stress     PHQ-9 Total Score: 11 (7/28/2021  1:36 PM)  Thoughts that you would be better off dead, or of hurting yourself in some way: 0 (7/28/2021  1:36 PM)    OBJECTIVE:  /62   Pulse 86   Temp 98.2 °F (36.8 °C) (Temporal)   Wt 139 lb 6.4 oz (63.2 kg)   LMP 04/02/2011   SpO2 98%   BMI 21.83 kg/m²    Physical Exam  Vitals reviewed. Constitutional:       General: She is not in acute distress. Appearance: She is well-developed. She is not diaphoretic. HENT:      Head: Normocephalic and atraumatic. Cardiovascular:      Rate and Rhythm: Normal rate and regular rhythm. Pulmonary:      Effort: Pulmonary effort is normal. No respiratory distress. Breath sounds: Normal breath sounds. No wheezing or rales. Chest:      Chest wall: No tenderness. Skin:     General: Skin is warm and dry. Neurological:      Mental Status: She is alert and oriented to person, place, and time. Coordination: Coordination normal.        ASSESSMENT/PLAN:  Julia Mabry was seen today for mass. Diagnoses and all orders for this visit:    Other stressful life events affecting family and household/Positive depression screening  -    PHQ 9 is 6. Denies suicidal or homicidal ideation.  - Since anxiety and dysphoric mood are frequent, I recommended a daily medication. The patient declines. - Recommended psychology referral- pt declines. - Patient reports that she would like an as needed medication for anxiety/anxiety attacks that she can take before bed as her mind races. - Options were reviewed.   Patient is agreeable to hydroxyzine. - Positive Screen for Clinical Depression with a Documented Follow-up Plan   -     hydrOXYzine (ATARAX) 50 MG tablet; Take 1 tablet by mouth every 8 hours as needed for Anxiety - patient education handout provided and reviewed with the pt. - Patient will call if symptoms worsen or fail to improve    Right arm pain   - Unable to palpate or locate the bump the patient is speaking of. The patient states she has a lot going on and she may not have any bump present. Reviewed imaging to evaluate further. The patient declines. She states she will use symptomatic management and monitor. She will call if symptoms worsen or fail to improve    Screening for breast cancer/screening for colon cancer  Mammogram and colon cancer screening not completed as ordered. Strongly recommend the patient complete mammogram and colonoscopy. Patient would like to complete the FIT test instead. 30 minutes were spent reviewing the chart, coordinating care of the patient and counseling face to face with the patient. Return for 2-4 weeks physical , or sooner if needed. Pt informed to call if symptoms worsen or fail to improve. All questions answered. Patient states no further questions or concerns at this time.     Electronically signed by NIEVES Carmichael CNP 07/28/21

## 2021-07-28 NOTE — PATIENT INSTRUCTIONS
Call to schedule mammogram  Bring stool sample back to the office      Patient Education   hydroxyzine  Pronunciation:  melissa reyes  Brand:  Vistaril  What is the most important information I should know about hydroxyzine? You should not use hydroxyzine if you are pregnant, especially during the first or second trimester. Hydroxyzine can cause a serious heart problem, especially if you use certain medicines at the same time. Tell your doctor about all your current medicines and any you start or stop using. What is hydroxyzine? Hydroxyzine reduces activity in the central nervous system. It also acts as an antihistamine that reduces the effects of natural chemical histamine in the body. Histamine can produce symptoms of itching, or hives on the skin. Hydroxyzine is used as a sedative to treat anxiety and tension. It is also used together with other medications given during and after general anesthesia. Hydroxyzine is also used to treat allergic skin reactions such as hives or contact dermatitis. Hydroxyzine may also be used for purposes not listed in this medication guide. What should I discuss with my healthcare provider before taking hydroxyzine? You should not use hydroxyzine if you are allergic to it, or if:  · you have long QT syndrome;  · you are allergic to cetirizine (Zyrtec) or levocetirizine (Xyzal); or  · you are in the first trimester of pregnancy. You should not use hydroxyzine if you are pregnant, especially during the first or second trimester. Hydroxyzine could harm the unborn baby or cause birth defects. Use effective birth control to prevent pregnancy while you are using this medicine.   To make sure hydroxyzine is safe for you, tell your doctor if you have:  · blockage in your digestive tract (stomach or intestines);  · bladder obstruction or other urination problems;  · glaucoma;  · heart disease, slow heartbeats;  · personal or family history of long QT syndrome;  · an electrolyte imbalance (such as high or low levels of potassium in your blood);  · if you have recently had a heart attack. It is not known whether hydroxyzine passes into breast milk or if it could harm a nursing baby. You should not breast-feed while using this medicine. Do not give this medicine to a child without medical advice. How should I take hydroxyzine? Follow all directions on your prescription label. Your doctor may occasionally change your dose. Do not use this medicine in larger or smaller amounts or for longer than recommended. Shake the oral suspension (liquid) well just before you measure a dose. Measure liquid medicine with the dosing syringe provided, or with a special dose-measuring spoon or medicine cup. If you do not have a dose-measuring device, ask your pharmacist for one. Hydroxyzine is for short-term use only. You should not take this medicine for longer than 4 months. Call your doctor if your anxiety symptoms do not improve, or if they get worse. Store at room temperature away from moisture and heat. What happens if I miss a dose? Take the missed dose as soon as you remember. Skip the missed dose if it is almost time for your next scheduled dose. Do not take extra medicine to make up the missed dose. What happens if I overdose? Seek emergency medical attention or call the Poison Help line at 1-614.924.5055. Overdose symptoms may include severe drowsiness, nausea, vomiting, uncontrolled muscle movements, or seizure (convulsions). What should I avoid while taking hydroxyzine? This medicine may impair your thinking or reactions. Be careful if you drive or do anything that requires you to be alert. Drinking alcohol with this medicine can cause side effects. What are the possible side effects of hydroxyzine? Get emergency medical help if you have signs of an allergic reaction:  hives; difficult breathing; swelling of your face, lips, tongue, or throat.   In rare cases, hydroxyzine may cause a severe skin reaction. Stop taking this medicine and call your doctor right away if you have sudden skin redness or a rash that spreads and causes white or yellow pustules, blistering, or peeling. Stop using hydroxyzine and call your doctor at once if you have:  · fast or pounding heartbeats;  · headache with chest pain;  · severe dizziness, fainting; or  · a seizure (convulsions). Side effects such as drowsiness and confusion may be more likely in older adults. Common side effects may include:  · drowsiness;  · headache;  · dry mouth; or  · skin rash. This is not a complete list of side effects and others may occur. Call your doctor for medical advice about side effects. You may report side effects to FDA at 8-307-FDA-0691. What other drugs will affect hydroxyzine? Taking this medicine with other drugs that make you sleepy can worsen this effect. Ask your doctor before taking hydroxyzine with a sleeping pill, narcotic pain medicine, muscle relaxer, or medicine for anxiety, depression, or seizures. Hydroxyzine can cause a serious heart problem, especially if you use certain medicines at the same time, including antibiotics, antidepressants, heart rhythm medicine, antipsychotic medicines, and medicines to treat cancer, malaria, HIV or AIDS. Tell your doctor about all medicines you use, and those you start or stop using during your treatment with hydroxyzine. Other drugs may interact with hydroxyzine, including prescription and over-the-counter medicines, vitamins, and herbal products. Not all possible interactions are listed here. Tell each of your health care providers about all medicines you use now and any medicine you start or stop using. Where can I get more information? Your pharmacist can provide more information about hydroxyzine.   Remember, keep this and all other medicines out of the reach of children, never share your medicines with others, and use this medication only for the indication

## 2021-08-17 ENCOUNTER — HOSPITAL ENCOUNTER (OUTPATIENT)
Dept: MAMMOGRAPHY | Age: 56
Discharge: HOME | End: 2021-08-17
Payer: COMMERCIAL

## 2021-08-17 ENCOUNTER — OFFICE VISIT (OUTPATIENT)
Dept: INTERNAL MEDICINE CLINIC | Age: 56
End: 2021-08-17
Payer: COMMERCIAL

## 2021-08-17 VITALS — BODY MASS INDEX: 21.97 KG/M2 | HEIGHT: 67 IN | WEIGHT: 140 LBS

## 2021-08-17 VITALS
DIASTOLIC BLOOD PRESSURE: 72 MMHG | BODY MASS INDEX: 22.24 KG/M2 | WEIGHT: 142 LBS | HEART RATE: 52 BPM | SYSTOLIC BLOOD PRESSURE: 120 MMHG | OXYGEN SATURATION: 97 %

## 2021-08-17 DIAGNOSIS — Z12.11 SCREEN FOR COLON CANCER: ICD-10-CM

## 2021-08-17 DIAGNOSIS — Z00.00 ANNUAL PHYSICAL EXAM: Primary | ICD-10-CM

## 2021-08-17 DIAGNOSIS — Z12.39 BREAST CANCER SCREENING: ICD-10-CM

## 2021-08-17 DIAGNOSIS — Z23 NEED FOR TDAP VACCINATION: ICD-10-CM

## 2021-08-17 DIAGNOSIS — Z12.31 ENCOUNTER FOR SCREENING MAMMOGRAM FOR MALIGNANT NEOPLASM OF BREAST: ICD-10-CM

## 2021-08-17 DIAGNOSIS — Z63.79 STRESSFUL LIFE EVENT AFFECTING FAMILY: ICD-10-CM

## 2021-08-17 PROCEDURE — 90471 IMMUNIZATION ADMIN: CPT | Performed by: NURSE PRACTITIONER

## 2021-08-17 PROCEDURE — 90715 TDAP VACCINE 7 YRS/> IM: CPT | Performed by: NURSE PRACTITIONER

## 2021-08-17 PROCEDURE — 77063 BREAST TOMOSYNTHESIS BI: CPT

## 2021-08-17 PROCEDURE — 99396 PREV VISIT EST AGE 40-64: CPT | Performed by: NURSE PRACTITIONER

## 2021-08-17 RX ORDER — BUSPIRONE HYDROCHLORIDE 5 MG/1
5 TABLET ORAL 2 TIMES DAILY
Qty: 60 TABLET | Refills: 0 | Status: SHIPPED | OUTPATIENT
Start: 2021-08-17 | End: 2021-09-16

## 2021-08-17 ASSESSMENT — ENCOUNTER SYMPTOMS
SINUS PAIN: 0
DIARRHEA: 0
VOMITING: 0
CONSTIPATION: 0
COUGH: 0
ABDOMINAL PAIN: 0
SORE THROAT: 0
SHORTNESS OF BREATH: 0
NAUSEA: 0
BLOOD IN STOOL: 0
WHEEZING: 0

## 2021-08-17 NOTE — PATIENT INSTRUCTIONS
of your face, lips, tongue, or throat. Call your doctor at once if you have:  · chest pain;  · shortness of breath; or  · a light-headed feeling, like you might pass out. Seek medical attention right away if you have symptoms of serotonin syndrome, such as: agitation, hallucinations, fever, sweating, shivering, fast heart rate, muscle stiffness, twitching, loss of coordination, nausea, vomiting, or diarrhea. Common side effects may include:  · headache;  · dizziness, feeling light-headed;  · nausea; or  · feeling nervous or excited. This is not a complete list of side effects and others may occur. Call your doctor for medical advice about side effects. You may report side effects to FDA at 0-013-FDA-7060. What other drugs will affect buspirone? Using buspirone with other drugs that make you drowsy or slow your breathing can worsen these effects. Ask your doctor before using opioid medication, a sleeping pill, a muscle relaxer, or medicine for anxiety or seizures. Tell your doctor about all your current medicines. Many drugs can affect buspirone, especially:  · nefazodone;  · [place]'s wort;  · an antibiotic --clarithromycin, erythromycin, rifabutin, rifampin, rifapentine, telithromycin;  · antifungal medicine --itraconazole, ketoconazole;  · antiviral medicine to treat HIV/AIDS --efavirenz, indinavir, nelfinavir, nevirapine, ritonavir, saquinavir;  · cancer medicine --apalutamide, enzalutamide, mitotane;  · heart or blood pressure medicines --diltiazem, verapamil;  · seizure medicine --carbamazepine, oxcarbazepine, phenytoin, primidone; or  · steroid medicine --dexamethasone, prednisone. This list is not complete and many other drugs may affect buspirone. This includes prescription and over-the-counter medicines, vitamins, and herbal products. Not all possible drug interactions are listed here. Where can I get more information? Your pharmacist can provide more information about buspirone.   Remember, keep use of this information.

## 2021-08-19 DIAGNOSIS — R92.8 ABNORMAL MAMMOGRAM: Primary | ICD-10-CM

## 2021-08-20 ENCOUNTER — TELEPHONE (OUTPATIENT)
Dept: INTERNAL MEDICINE CLINIC | Age: 56
End: 2021-08-20

## 2021-08-20 NOTE — TELEPHONE ENCOUNTER
Pt just wanted to let you know that she seen the doctor at [facility] and that she does not recommend them, feels that nothing was done and that it was a really useless appt.

## 2021-08-24 NOTE — TELEPHONE ENCOUNTER
I am happy to send an external referral to another dermatologist if she would like a second opinion.

## 2021-09-22 ENCOUNTER — HOSPITAL ENCOUNTER (OUTPATIENT)
Dept: ULTRASOUND IMAGING | Age: 56
Discharge: HOME | End: 2021-09-22
Payer: COMMERCIAL

## 2021-09-22 ENCOUNTER — HOSPITAL ENCOUNTER (OUTPATIENT)
Dept: WOMENS IMAGING | Age: 56
Discharge: HOME | End: 2021-09-22
Payer: COMMERCIAL

## 2021-09-22 DIAGNOSIS — R92.8 ABNORMAL MAMMOGRAM: Primary | ICD-10-CM

## 2021-09-22 DIAGNOSIS — R92.8 ABNORMAL MAMMOGRAM: ICD-10-CM

## 2021-09-22 LAB
CANDIDA SPECIES, DNA PROBE: ABNORMAL
GARDNERELLA VAGINALIS, DNA PROBE: ABNORMAL
TRICHOMONAS VAGINALIS DNA: ABNORMAL

## 2021-09-22 PROCEDURE — 76641 ULTRASOUND BREAST COMPLETE: CPT

## 2021-09-22 PROCEDURE — G0279 TOMOSYNTHESIS, MAMMO: HCPCS

## 2021-09-23 LAB
ORGANISM: ABNORMAL
URINE CULTURE, ROUTINE: ABNORMAL

## 2021-09-28 ENCOUNTER — TELEPHONE (OUTPATIENT)
Dept: WOMENS IMAGING | Age: 56
End: 2021-09-28

## 2021-09-28 NOTE — TELEPHONE ENCOUNTER
Pt called this morning to cancel recommended breast bx scheduled for 10/6/21. Stated \"I just can't do it - it's too much right now\",   I'll talk to my doctors and will just follow or something else, I just can't do it, please cancel my appt\". NN attempted to clarify but Hermelinda Silva was very intent so was unable to discern her concerns at this time. NN will communicate with her PCP/OB GYN with an update for their follow up.

## 2021-09-30 NOTE — TELEPHONE ENCOUNTER
Message from breast nurse navigator:    Anna Tony, RN  NIEVES George CNP  Grant Long,     Just wanted you to know she cancelled. Sounded quite stressed but I wasn't able to get a good feel for her situation. Thi Farr said she was reluctant to have a marker placed but I don't know if that contributed to her cancelling. If she doesn't pursue a bx, she probably needs a six month f/u to evaluate changes? Thanks, perhaps you know her better. Mila Shi \"    Pt did not answer my call yesterday. I attempted to call her again today. The phone rang once and then went straight to . [facility] for pt to call back to discuss.     Electronically signed by: NIEVES George CNP 09/30/21

## 2021-10-12 ENCOUNTER — TELEPHONE (OUTPATIENT)
Dept: INTERNAL MEDICINE CLINIC | Age: 56
End: 2021-10-12

## 2021-10-12 NOTE — TELEPHONE ENCOUNTER
Patient calling requesting refill of busPIRone (BUSPAR) 5 MG tablet    Last written 08/17/21  Last OV 08/17/21  Next OV nothing scheduled  Last recommended OV 09/14/21     Please send to [facility] on [address] in [facility]. Patient also states she will make an appointment as soon as she is able to discuss biopsy with Patricia Marte. She states she has received a few calls and she will schedule.

## 2021-10-12 NOTE — TELEPHONE ENCOUNTER
I would like to discuss biopsy with her. Also she is overdue for f/u for mood. Needs appt. Can be VV.

## 2021-10-21 ENCOUNTER — TELEPHONE (OUTPATIENT)
Dept: INTERNAL MEDICINE CLINIC | Age: 56
End: 2021-10-21

## 2021-10-21 NOTE — TELEPHONE ENCOUNTER
----- Message from April Newman, [address] - CNP sent at 10/20/2021  7:21 AM EDT -----  I would like to discuss biopsy with her. Also she is overdue for f/u for mood. Needs appt.  Can be VV      Please document

## 2021-11-17 ENCOUNTER — TELEPHONE (OUTPATIENT)
Dept: INTERNAL MEDICINE CLINIC | Age: 56
End: 2021-11-17

## 2021-11-17 NOTE — TELEPHONE ENCOUNTER
Called the patient to discuss getting the breast biopsy completed. The patient states she is not interested in performing a breast biopsy. She states that she is aware of why they want to complete the breast biopsy and the risks versus the benefits. She is aware of the mammogram results. However, she states she is not interested in completing the biopsy. She states she has no questions and she is not interested in scheduling at this time. She will call with any questions or if she decides to schedule.     Electronically signed by: NIEVES Reeves CNP 11/17/21

## 2022-02-09 ENCOUNTER — TELEPHONE (OUTPATIENT)
Dept: INTERNAL MEDICINE CLINIC | Age: 57
End: 2022-02-09

## 2022-02-09 NOTE — TELEPHONE ENCOUNTER
----- Message from Anna Watkins, [address] - CNP sent at 1/26/2022  7:49 AM EST -----  Appears patient is overdue for routine follow-up with PCP as well as labs. Please schedule and please document call.

## 2022-06-07 ENCOUNTER — TELEPHONE (OUTPATIENT)
Dept: INTERNAL MEDICINE CLINIC | Age: 57
End: 2022-06-07

## 2022-06-07 NOTE — TELEPHONE ENCOUNTER
----- Message from Mayra Joshi sent at 6/7/2022 11:37 AM EDT -----    ----- Message -----  From: NIEVES Mccarthy CNP  Sent: 6/7/2022   7:41 AM EDT  To: , #    Please call pt- due for routine f/u. Please schedule. Please document outreach.

## 2022-08-09 ENCOUNTER — TELEPHONE (OUTPATIENT)
Dept: INTERNAL MEDICINE CLINIC | Age: 57
End: 2022-08-09

## 2022-08-09 NOTE — TELEPHONE ENCOUNTER
----- Message from Ella Thompson, [address] - CNP sent at 7/12/2022  7:31 AM EDT -----  Please call pt and LVM/document and send letter stating we recommend f/u and an annual physical exam with the office. Multiple attempts have been made - need to document each time to ensure/cover. Thanks! Message from breast nurse navigator:     \"Ivett Larsonbennie Pugh, [address],     Just wanted you to know she cancelled.  Sounded quite stressed but I wasn't able to get a good feel for her situation. Rey Clements said she was reluctant to have a marker placed but I don't know if that contributed to her cancelling.  If she doesn't pursue a bx, she probably needs a six month f/u to evaluate changes?  Thanks, perhaps you know her better. Claudette Greco \"     Pt did not answer my call yesterday. I attempted to call her again today. The phone rang once and then went straight to .  [facility] for pt to call back to discuss.     Electronically signed by: NIEVES Rankin CNP 09/30/21

## 2023-01-10 ENCOUNTER — TELEPHONE (OUTPATIENT)
Dept: WOMENS IMAGING | Age: 58
End: 2023-01-10

## 2023-01-10 NOTE — TELEPHONE ENCOUNTER
Left message for patient on VM requesting return call. Reaching out to schedule follow-up mammogram and ultrasound.

## 2023-01-25 ENCOUNTER — TELEPHONE (OUTPATIENT)
Dept: WOMENS IMAGING | Age: 58
End: 2023-01-25

## 2023-04-17 ENCOUNTER — HOSPITAL ENCOUNTER (OUTPATIENT)
Dept: WOMENS IMAGING | Age: 58
Discharge: HOME | End: 2023-04-17
Payer: COMMERCIAL

## 2023-04-17 ENCOUNTER — HOSPITAL ENCOUNTER (OUTPATIENT)
Dept: ULTRASOUND IMAGING | Age: 58
Discharge: HOME | End: 2023-04-17
Payer: COMMERCIAL

## 2023-04-17 VITALS — HEIGHT: 67 IN | WEIGHT: 150 LBS | BODY MASS INDEX: 23.54 KG/M2

## 2023-04-17 DIAGNOSIS — R92.8 ABNORMAL MAMMOGRAM: ICD-10-CM

## 2023-04-17 DIAGNOSIS — N64.4 MASTODYNIA: ICD-10-CM

## 2023-04-17 DIAGNOSIS — N63.10 MASS OF RIGHT BREAST, UNSPECIFIED QUADRANT: ICD-10-CM

## 2023-04-17 DIAGNOSIS — N64.4 BREAST PAIN: ICD-10-CM

## 2023-04-17 PROCEDURE — G0279 TOMOSYNTHESIS, MAMMO: HCPCS

## 2023-04-17 PROCEDURE — 76642 ULTRASOUND BREAST LIMITED: CPT

## 2023-06-08 ENCOUNTER — TELEPHONE (OUTPATIENT)
Dept: INTERNAL MEDICINE CLINIC | Age: 58
End: 2023-06-08

## 2023-06-08 NOTE — TELEPHONE ENCOUNTER
Message left for patient is over due for annual physical exam, Patient requested to call office to get schedule phone # provided.

## 2024-03-25 ENCOUNTER — TELEPHONE (OUTPATIENT)
Dept: INTERNAL MEDICINE CLINIC | Age: 59
End: 2024-03-25

## 2025-05-09 ENCOUNTER — OFFICE VISIT (OUTPATIENT)
Age: 60
End: 2025-05-09

## 2025-05-09 VITALS
SYSTOLIC BLOOD PRESSURE: 124 MMHG | WEIGHT: 150 LBS | BODY MASS INDEX: 24.11 KG/M2 | HEART RATE: 84 BPM | HEIGHT: 66 IN | DIASTOLIC BLOOD PRESSURE: 74 MMHG | TEMPERATURE: 97 F | OXYGEN SATURATION: 97 % | RESPIRATION RATE: 16 BRPM

## 2025-05-09 DIAGNOSIS — R09.81 CONGESTION OF NASAL SINUS: ICD-10-CM

## 2025-05-09 DIAGNOSIS — J01.10 ACUTE NON-RECURRENT FRONTAL SINUSITIS: Primary | ICD-10-CM

## 2025-05-09 RX ORDER — DEXTROMETHORPHAN HYDROBROMIDE, GUAIFENESIN AND PSEUDOEPHEDRINE HYDROCHLORIDE 15; 400; 60 MG/1; MG/1; MG/1
1 TABLET ORAL EVERY 6 HOURS PRN
Qty: 20 TABLET | Refills: 0 | Status: SHIPPED | OUTPATIENT
Start: 2025-05-09 | End: 2025-05-14

## 2025-05-09 RX ORDER — DEXTROMETHORPHAN HYDROBROMIDE, GUAIFENESIN AND PSEUDOEPHEDRINE HYDROCHLORIDE 15; 400; 60 MG/1; MG/1; MG/1
1 TABLET ORAL EVERY 6 HOURS
Qty: 20 TABLET | Refills: 0 | Status: SHIPPED | OUTPATIENT
Start: 2025-05-09 | End: 2025-05-09

## 2025-05-09 RX ORDER — PREDNISONE 20 MG/1
20 TABLET ORAL 2 TIMES DAILY
Qty: 6 TABLET | Refills: 0 | Status: SHIPPED | OUTPATIENT
Start: 2025-05-09 | End: 2025-05-12

## 2025-05-09 ASSESSMENT — ENCOUNTER SYMPTOMS
EYES NEGATIVE: 1
COUGH: 1
GASTROINTESTINAL NEGATIVE: 1
WHEEZING: 0
TROUBLE SWALLOWING: 0
RHINORRHEA: 1
SORE THROAT: 0
SINUS PRESSURE: 1
SINUS COMPLAINT: 1
FACIAL SWELLING: 1

## 2025-05-09 NOTE — PROGRESS NOTES
Krissy Mcmullen (:  1965) is a 59 y.o. female,New patient, here for evaluation of the following chief complaint(s):  Sinus Problem (Sxs 2 weeks ) and Dizziness (Sxs 2 weeks )      ASSESSMENT/PLAN:  1. Acute non-recurrent frontal sinusitis    - amoxicillin-clavulanate (AUGMENTIN) 875-125 MG per tablet; Take 1 tablet by mouth 2 times daily for 10 days  Dispense: 20 tablet; Refill: 0  - predniSONE (DELTASONE) 20 MG tablet; Take 1 tablet by mouth 2 times daily for 3 days  Dispense: 6 tablet; Refill: 0  - Pseudoephedrine-DM-GG (CAPMIST DM) 60- MG TABS; Take 1 tablet by mouth every 6 hours as needed (congestion, cough)  Dispense: 20 tablet; Refill: 0  -Take a medicine like acetaminophen (sample brand name: Tylenol) or ibuprofen (sample brand names: Advil, Motrin) to help bring down your fever or for discomfort.    2. Congestion of nasal sinus  - Pseudoephedrine-DM-GG (CAPMIST DM) 60- MG TABS; Take 1 tablet by mouth every 6 hours as needed (congestion, cough)  Dispense: 20 tablet; Refill: 0        Return if symptoms worsen or fail to improve.    SUBJECTIVE/OBJECTIVE:  HPI:   59 y.o. female with [place] Palsy since 2025,  presents for complaint of sinus symptoms x 2 weeks    Admits symptoms include  runny nose, nasal congestion, cough, and ear pain  Denies fever but has felt feverish    Nothing is making symptoms better    Has attempted Sudafed, echinacea, oregano for symptoms. Has not helped    HPI provided by Patient  and is considered to be a reliable historian.        History provided by:  Patient  Sinus Problem  Associated symptoms include congestion, coughing, ear pain (ear pressure), headaches and sinus pressure. Pertinent negatives include no chills or sore throat.   Dizziness  Associated symptoms: headaches        Vitals:    25 0939   BP: 130/88   Pulse: 84   Resp: 16   Temp: 97 °F (36.1 °C)   TempSrc: Temporal   SpO2: 97%   Weight: 68 kg (150 lb)   Height: 1.676 m (5' 6\")